# Patient Record
Sex: FEMALE | URBAN - METROPOLITAN AREA
[De-identification: names, ages, dates, MRNs, and addresses within clinical notes are randomized per-mention and may not be internally consistent; named-entity substitution may affect disease eponyms.]

---

## 2024-03-28 ENCOUNTER — HOSPITAL ENCOUNTER (EMERGENCY)
Facility: HOSPITAL | Age: 19
Discharge: HOME | End: 2024-03-28
Attending: EMERGENCY MEDICINE
Payer: MEDICAID

## 2024-03-28 ENCOUNTER — NURSE TRIAGE (OUTPATIENT)
Dept: ADMINISTRATIVE | Facility: CLINIC | Age: 19
End: 2024-03-28
Payer: MEDICAID

## 2024-03-28 VITALS
DIASTOLIC BLOOD PRESSURE: 69 MMHG | BODY MASS INDEX: 29.27 KG/M2 | SYSTOLIC BLOOD PRESSURE: 127 MMHG | HEART RATE: 67 BPM | TEMPERATURE: 98 F | RESPIRATION RATE: 18 BRPM | HEIGHT: 61 IN | OXYGEN SATURATION: 99 % | WEIGHT: 155 LBS

## 2024-03-28 DIAGNOSIS — M25.551 RIGHT HIP PAIN: ICD-10-CM

## 2024-03-28 LAB — B-HCG SERPL QL: NEGATIVE

## 2024-03-28 PROCEDURE — 99283 EMERGENCY DEPT VISIT LOW MDM: CPT | Mod: 25

## 2024-03-28 PROCEDURE — 81025 URINE PREGNANCY TEST: CPT | Performed by: PHYSICIAN ASSISTANT

## 2024-03-28 RX ORDER — DICLOFENAC SODIUM 50 MG/1
50 TABLET, DELAYED RELEASE ORAL 3 TIMES DAILY
Qty: 21 TABLET | Refills: 0 | OUTPATIENT
Start: 2024-03-28 | End: 2024-04-04

## 2024-03-28 RX ORDER — DICLOFENAC SODIUM 50 MG/1
50 TABLET, DELAYED RELEASE ORAL 3 TIMES DAILY
Qty: 21 TABLET | Refills: 0 | OUTPATIENT
Start: 2024-03-28 | End: 2024-03-28

## 2024-03-28 NOTE — ED PROVIDER NOTES
Encounter Date: 3/28/2024       History     Chief Complaint   Patient presents with    Hip Pain     C/o right hip pain x3 days, worse with walking; reports crossed her leg and heard a pop; CMS intact.      19-year-old female presents to ED for evaluation of right hip pain worsening over the last 2-3 days.  Patient reports to sitting cross-legged when she heard a pop in her right hip causing pain.  Reports pain with movement.  Has not taken any medication.  Denies any trauma.      The history is provided by the patient. No  was used.     Review of patient's allergies indicates:  No Known Allergies  Past Medical History:   Diagnosis Date    Other seasonal allergic rhinitis      History reviewed. No pertinent surgical history.  History reviewed. No pertinent family history.  Social History     Tobacco Use    Smoking status: Never    Smokeless tobacco: Never     Review of Systems   Constitutional:  Negative for chills, fatigue and fever.   HENT:  Negative for sore throat.    Respiratory:  Negative for shortness of breath.    Cardiovascular:  Negative for chest pain.   Gastrointestinal:  Negative for abdominal pain, diarrhea, nausea and vomiting.   Genitourinary:  Negative for dysuria, flank pain, frequency and urgency.   Musculoskeletal:  Positive for arthralgias, joint swelling and myalgias. Negative for back pain.   Skin:  Negative for rash.   Neurological:  Negative for weakness.   Hematological:  Does not bruise/bleed easily.   All other systems reviewed and are negative.      Physical Exam     Initial Vitals [03/28/24 1836]   BP Pulse Resp Temp SpO2   132/77 106 20 99.6 °F (37.6 °C) 100 %      MAP       --         Physical Exam    Vitals reviewed.  Constitutional: She appears well-developed.   HENT:   Head: Normocephalic and atraumatic.   Right Ear: Tympanic membrane and external ear normal.   Left Ear: Tympanic membrane and external ear normal.   Mouth/Throat: Uvula is midline, oropharynx is  clear and moist and mucous membranes are normal. No trismus in the jaw. No uvula swelling. No oropharyngeal exudate, posterior oropharyngeal edema or posterior oropharyngeal erythema.   Eyes: Conjunctivae are normal. Pupils are equal, round, and reactive to light.   Neck: Neck supple.   Normal range of motion.  Cardiovascular:  Normal rate, regular rhythm and normal heart sounds.           Pulmonary/Chest: Breath sounds normal. She has no wheezes. She has no rhonchi. She has no rales.   Abdominal: Abdomen is soft. Bowel sounds are normal. There is no abdominal tenderness.   Musculoskeletal:         General: Normal range of motion.      Cervical back: Normal range of motion and neck supple.      Right hip: Tenderness present. No deformity, lacerations or bony tenderness. Normal range of motion.      Comments: DP pulses 2+     Neurological: She is alert and oriented to person, place, and time. She has normal strength. No cranial nerve deficit or sensory deficit. GCS score is 15. GCS eye subscore is 4. GCS verbal subscore is 5. GCS motor subscore is 6.   Skin: Skin is warm and dry.   Psychiatric: She has a normal mood and affect.         ED Course   Procedures  Labs Reviewed   PREGNANCY TEST, URINE RAPID - Normal          Imaging Results              X-Ray Hip 2 or 3 views Right (with Pelvis when performed) (Final result)  Result time 03/28/24 19:45:34      Final result by Hadley Lomeli MD (03/28/24 19:45:34)                   Impression:      No acute findings.      Electronically signed by: Hadley Lomeli  Date:    03/28/2024  Time:    19:45               Narrative:    EXAMINATION:  XR HIP WITH PELVIS WHEN PERFORMED, 2 OR 3  VIEWS RIGHT    CLINICAL HISTORY:  Pain in right hip    COMPARISON:  None    FINDINGS:  Frontal view of the pelvis with a frogleg lateral view of the right hip.  There is no fracture or dislocation.                                       Medications - No data to display  Medical Decision  Making  19-year-old female presents to ED for evaluation of right hip pain worsening over the last 2-3 days.  Patient reports to sitting cross-legged when she heard a pop in her right hip causing pain.  Reports pain with movement.  Has not taken any medication.  Denies any trauma.    Differential diagnosis includes but isn't limited to hip pain, contusion strain, fracture, dislocation    Amount and/or Complexity of Data Reviewed  Radiology: ordered and independent interpretation performed. Decision-making details documented in ED Course.  Discussion of management or test interpretation with external provider(s): Patient GCS 15 and neuro intact.  Ambulatory with steady gait.  Presents to ED for evaluation of right hip pain after hearing a pop in her hip.  Reports pain worse with movement.  X-ray obtained showing no acute fracture or dislocation.  Will treat with short course of NSAIDs.  Discussed follow up with PCP for further evaluation with possible PT or MRI.  Return ED precautions given.  Patient verbalizes understanding.    Risk  OTC drugs.  Prescription drug management.                                      Clinical Impression:  Final diagnoses:  [M25.551] Right hip pain          ED Disposition Condition    Discharge Stable          ED Prescriptions       Medication Sig Dispense Start Date End Date Auth. Provider    diclofenac (VOLTAREN) 50 MG EC tablet  (Status: Discontinued) Take 1 tablet (50 mg total) by mouth 3 (three) times daily. for 7 days 21 tablet 3/28/2024 3/28/2024 Ann-Marie Coughlin PA    diclofenac (VOLTAREN) 50 MG EC tablet Take 1 tablet (50 mg total) by mouth 3 (three) times daily. for 7 days 21 tablet 3/28/2024 4/4/2024 Ann-Marie Coughlin PA          Follow-up Information       Follow up With Specialties Details Why Contact Info    PCP  In 1 week As needed, If you do not have a PCP you may call [phone] to help get set up If you do not have a PCP you may call [phone] to help get set up.              Ann-Marie Coughlin PA  03/28/24 2025

## 2024-03-29 NOTE — DISCHARGE INSTRUCTIONS
You have been prescribed Diclofenac for pain. This is an Non-Steroidal Anti-Inflammatory (NSAID) Medication. Please do not take any additional NSAIDs while you are taking this medication including (Advil, Aleve, Motrin, Ibuprofen, Mobic\meloxicam, Naprosyn, Toradol, ketoralac, etc.). Please stop taking this medication if you experience: weakness, itching, yellow skin or eyes, joint pains, vomiting blood, blood or black stools, unusual weight gain, or swelling in your arms, legs, hands, or feet.    yes...

## 2024-03-29 NOTE — TELEPHONE ENCOUNTER
Patient states diflucan is not at the pharmacy. Noted medication is ordered at ED visit of today.   I will call to pharmacy for confirmation.  Mark,pharmacist. Called and RX given   No PCP    Reason for Disposition   [1] Prescription prescribed recently is not at pharmacy AND [2] triager has access to patient's EMR AND [3] prescription is recorded in the EMR    Protocols used: Medication Question Call-A-AH